# Patient Record
Sex: MALE | Race: WHITE | NOT HISPANIC OR LATINO | URBAN - METROPOLITAN AREA
[De-identification: names, ages, dates, MRNs, and addresses within clinical notes are randomized per-mention and may not be internally consistent; named-entity substitution may affect disease eponyms.]

---

## 2022-12-29 ENCOUNTER — OFFICE VISIT (OUTPATIENT)
Dept: FAMILY MEDICINE | Facility: CLINIC | Age: 32
End: 2022-12-29
Payer: COMMERCIAL

## 2022-12-29 VITALS
RESPIRATION RATE: 16 BRPM | SYSTOLIC BLOOD PRESSURE: 122 MMHG | OXYGEN SATURATION: 98 % | BODY MASS INDEX: 21.85 KG/M2 | WEIGHT: 156.12 LBS | TEMPERATURE: 98.7 F | HEART RATE: 69 BPM | HEIGHT: 71 IN | DIASTOLIC BLOOD PRESSURE: 83 MMHG

## 2022-12-29 DIAGNOSIS — Z00.00 ANNUAL PHYSICAL EXAM: Primary | ICD-10-CM

## 2022-12-29 DIAGNOSIS — Z13.220 SCREENING FOR LIPOID DISORDERS: ICD-10-CM

## 2022-12-29 DIAGNOSIS — Z11.3 SCREEN FOR STD (SEXUALLY TRANSMITTED DISEASE): ICD-10-CM

## 2022-12-29 PROBLEM — M25.511 CHRONIC RIGHT SHOULDER PAIN: Status: ACTIVE | Noted: 2022-10-31

## 2022-12-29 PROBLEM — G89.29 CHRONIC RIGHT SHOULDER PAIN: Status: ACTIVE | Noted: 2022-10-31

## 2022-12-29 PROBLEM — M75.41 INTERNAL IMPINGEMENT OF RIGHT SHOULDER: Status: ACTIVE | Noted: 2022-10-31

## 2022-12-29 LAB
ANION GAP SERPL CALCULATED.3IONS-SCNC: 14 MMOL/L (ref 7–15)
BUN SERPL-MCNC: 12.3 MG/DL (ref 6–20)
CALCIUM SERPL-MCNC: 9.6 MG/DL (ref 8.6–10)
CHLORIDE SERPL-SCNC: 101 MMOL/L (ref 98–107)
CHOLEST SERPL-MCNC: 185 MG/DL
CREAT SERPL-MCNC: 0.84 MG/DL (ref 0.67–1.17)
DEPRECATED HCO3 PLAS-SCNC: 24 MMOL/L (ref 22–29)
GFR SERPL CREATININE-BSD FRML MDRD: >90 ML/MIN/1.73M2
GLUCOSE SERPL-MCNC: 91 MG/DL (ref 70–99)
HCV AB SERPL QL IA: NONREACTIVE
HDLC SERPL-MCNC: 62 MG/DL
HIV 1+2 AB+HIV1 P24 AG SERPL QL IA: NONREACTIVE
LDLC SERPL CALC-MCNC: 111 MG/DL
NONHDLC SERPL-MCNC: 123 MG/DL
POTASSIUM SERPL-SCNC: 4.2 MMOL/L (ref 3.4–5.3)
SODIUM SERPL-SCNC: 139 MMOL/L (ref 136–145)
T PALLIDUM AB SER QL: NONREACTIVE
TRIGL SERPL-MCNC: 60 MG/DL

## 2022-12-29 PROCEDURE — 87389 HIV-1 AG W/HIV-1&-2 AB AG IA: CPT | Performed by: FAMILY MEDICINE

## 2022-12-29 PROCEDURE — 80061 LIPID PANEL: CPT | Performed by: FAMILY MEDICINE

## 2022-12-29 PROCEDURE — 86803 HEPATITIS C AB TEST: CPT | Performed by: FAMILY MEDICINE

## 2022-12-29 PROCEDURE — 87591 N.GONORRHOEAE DNA AMP PROB: CPT | Performed by: FAMILY MEDICINE

## 2022-12-29 PROCEDURE — 87491 CHLMYD TRACH DNA AMP PROBE: CPT | Performed by: FAMILY MEDICINE

## 2022-12-29 PROCEDURE — 86780 TREPONEMA PALLIDUM: CPT | Performed by: FAMILY MEDICINE

## 2022-12-29 PROCEDURE — 80048 BASIC METABOLIC PNL TOTAL CA: CPT | Performed by: FAMILY MEDICINE

## 2022-12-29 ASSESSMENT — ENCOUNTER SYMPTOMS
PARESTHESIAS: 0
HEADACHES: 0
COUGH: 0
HEARTBURN: 0
JOINT SWELLING: 0
SORE THROAT: 0
FREQUENCY: 0
NERVOUS/ANXIOUS: 0
SHORTNESS OF BREATH: 0
WEAKNESS: 0
PALPITATIONS: 0
MYALGIAS: 0
DIZZINESS: 0
FEVER: 0
EYE PAIN: 0
HEMATOCHEZIA: 0
NAUSEA: 0
DYSURIA: 0
ABDOMINAL PAIN: 0
ARTHRALGIAS: 0
HEMATURIA: 0
CONSTIPATION: 0
CHILLS: 0
DIARRHEA: 0

## 2022-12-29 NOTE — NURSING NOTE
"32 year old  Chief Complaint   Patient presents with     Physical     Routine.       Blood pressure 122/83, pulse 69, temperature 98.7  F (37.1  C), temperature source Skin, resp. rate 16, height 1.791 m (5' 10.5\"), weight 70.8 kg (156 lb 1.9 oz), SpO2 98 %. Body mass index is 22.08 kg/m .  There is no problem list on file for this patient.      Wt Readings from Last 2 Encounters:   12/29/22 70.8 kg (156 lb 1.9 oz)     BP Readings from Last 3 Encounters:   12/29/22 122/83         No current outpatient medications on file.     No current facility-administered medications for this visit.       Social History     Tobacco Use     Smoking status: Never     Smokeless tobacco: Never   Vaping Use     Vaping Use: Never used   Substance Use Topics     Alcohol use: Yes     Comment: 1 per day     Drug use: Never       Health Maintenance Due   Topic Date Due     YEARLY PREVENTIVE VISIT  Never done     ADVANCE CARE PLANNING  Never done     HIV SCREENING  Never done     HEPATITIS C SCREENING  Never done       No results found for: PAP      December 29, 2022 8:04 AM    "

## 2022-12-29 NOTE — PROGRESS NOTES
"Johann Costello presents to [facility] today to have an annual exam.     IMPRESSION  Healthy 31 yo male    ASSESSMENT/PLAN:    Annual Exam/Preventive Issues   -Check Lipids, BMP  - Screen for STDs. Discussed issues of false positives in low risk populations  - Discussed recommendations for healthy lifestyles  - Sign up for the \"[facility] [employer]\" system      -Follow up: in 1 year or sooner as needed.     Mikey Vega MD, MS    Introduction: This is Johann's first visit to our clinic.  He is here for an annual exam.  He is a healthy 32-year-old  who will be traveling to [facility] where he will be working remotely for the next few months.    He has no current concerns.  He has not had a physical exam in years.  With his upcoming travel and the end of the year coming, it occurred to him that he should schedule for a preventive visit exam.    Current Medications include:   No current outpatient medications on file.     No Known Allergies      Social  Social History     Social History Narrative    From [facility].     Went to [employer]. Studied environmental engineering. Works in [employer].     Was living in [place], [place].     Single.          Lifestyle habits and Preventive health issues:     Physical activity : Runs but limited by recent RIGHT 5th MT fracture. Enjoys lifting and skiing.   Diet is generally healthy    Alcohol intake involves about 3-4 nights/week and 3-4 drinks/night.    He does not use tobacco products.   His sleep is good.     Wears seat belt.--Yes.  Wears bike helmet--Yes.      MALE ROS  Partner: None.   Contraception: Condoms  STD concerns: None, but willing to be checked.     Dental history- No problems. Has regular care.     ROS  PHQ-2 Score:     PHQ-2 ( 1999 Pfizer) 12/29/2022   Q1: Little interest or pleasure in doing things 0   Q2: Feeling down, depressed or hopeless 0   PHQ-2 Score 0   Q1: Little interest or pleasure in doing things Not at all   Q2: Feeling down, " depressed or hopeless Not at all   PHQ-2 Score 0       Health Maintenance   Topic Date Due     YEARLY PREVENTIVE VISIT  Never done     ADVANCE CARE PLANNING  Never done     HIV SCREENING  Never done     HEPATITIS C SCREENING  Never done     DTAP/TDAP/TD IMMUNIZATION (8 - Td or Tdap) 10/12/2032     PHQ-2 (once per calendar year)  Completed     INFLUENZA VACCINE  Completed     IPV IMMUNIZATION  Completed     MENINGITIS IMMUNIZATION  Completed     HEPATITIS B IMMUNIZATION  Completed     COVID-19 Vaccine  Completed     Pneumococcal Vaccine: Pediatrics (0 to 5 Years) and At-Risk Patients (6 to 64 Years)  Aged Out         There is no problem list on file for this patient.      No past surgical history on file.    No family history on file.      Immunizations are as follows:      Immunization History   Administered Date(s) Administered     COVID-19 Vaccine 12+ (Pfizer) 04/05/2021, 04/26/2021     COVID-19 Vaccine 18+ (Moderna) 12/01/2021     COVID-19 Vaccine Bivalent Booster 12+ (Pfizer) 10/12/2022     DTAP (<7y) 1989, 1989, 02/21/1990, 02/19/1991, 07/12/1995     FLU 6-35 months 01/07/2010     Flu, Unspecified 01/07/2010     Hep B, Peds or Adolescent 1989, 02/08/1990, 08/08/1990     HepA-ped 2 Dose 03/05/2008, 06/09/2009     HepB, Unspecified 1989, 02/08/1990, 08/08/1990     Hib, Unspecified 02/19/1991, 07/21/1992     Influenza (H1N1) 01/07/2010     Influenza (IIV3) PF 11/09/2006, 11/09/2007     Influenza Intranasal Vaccine 11/16/2005     Influenza Vaccine >6 months (Alfuria,Fluzone) 10/28/2020, 10/12/2022     Japanese Encephalitis IM 06/18/2012, 07/16/2012, 10/12/2022     MMR 11/08/1990, 11/29/2001     Meningococcal ACWY (Menactra ) 03/05/2008     Nasal Influenza Vaccine 2-49 (FluMist) 11/16/2005     Polio, Unspecified  1989, 1989, 02/19/1991, 07/12/1995     Poliovirus, inactivated (IPV) 1989, 1989, 02/19/1991, 07/12/1995     Ramirez 06/18/2012, 06/25/2012, 07/16/2012     Td  "(Adult), Adsorbed 06/16/2003     Tdap (Adacel,Boostrix) 03/05/2008, 10/12/2022     Typhoid IM 06/18/2012, 06/03/2014     Yellow Fever 06/03/2014         EXAM  /83 (BP Location: Left arm, Patient Position: Sitting, Cuff Size: Adult Regular)   Pulse 69   Temp 98.7  F (37.1  C) (Skin)   Resp 16   Ht 1.791 m (5' 10.5\")   Wt 70.8 kg (156 lb 1.9 oz)   SpO2 98%   BMI 22.08 kg/m      GENERAL APPEARANCE: Appears well  HEENT: Neck is supple. No adenopathy, thyroid normal to palpation  RESP: Lungs clear to auscultation bilaterally.  Axillae: no palpable axillary masses or adenopathy  CV: regular rate and rhythm, normal S1 S2, no murmur, no carotid bruits  ABDOMEN: soft, nontender, without HSM or masses. Bowel sounds normal  : Normal male genitalia.  Testes are down.  No masses.  No hernias.   Rectal exam: deferred  MS: Extremities normal- no gross deformities noted, no tender, hot or swollen joints.    SKIN: no suspicious lesions or rashes  NEURO: Normal strength and tone, sensory exam grossly normal  PSYCH: mentation appears normal. and affect normal/bright.  EXT: no peripheral edema    SEE TOP OF NOTE FOR ASSESSMENT AND PLAN      Mikey Vega MD, MS  [facility] Department of Family Medicine and Community Health        "

## 2022-12-29 NOTE — PATIENT INSTRUCTIONS
"IMPRESSION  Healthy 31 yo male    ASSESSMENT/PLAN:    Annual Exam/Preventive Issues   -Check Lipids, BMP  - Screen for STDs  - Discussed recommendations for healthy lifestyles  - Sign up for the \"[employer]\" system      -Follow up: in 1 year or sooner as needed.     Mikey Vega MD, MS  "

## 2022-12-29 NOTE — LETTER
January 2, 2023      Johann Costello  [address]   [facility] [id number]      Johann,   Good news.   All tests were negative and your cholesterol levels were in the normal range.   -Mikey Vega MD       Resulted Orders   NEISSERIA GONORRHOEA PCR   Result Value Ref Range    Neisseria gonorrhoeae Negative Negative      Comment:      Negative for N. gonorrhoeae rRNA by transcription mediated amplification. A negative result by transcription mediated amplification does not preclude the presence of C. trachomatis infection because results are dependent on proper and adequate collection, absence of inhibitors and sufficient rRNA to be detected.   CHLAMYDIA TRACHOMATIS PCR   Result Value Ref Range    Chlamydia trachomatis Negative Negative      Comment:      A negative result by transcription mediated amplification does not preclude the presence of C. trachomatis infection because results are dependent on proper and adequate collection, absence of inhibitors and sufficient rRNA to be detected.   Basic metabolic panel   Result Value Ref Range    Sodium 139 136 - 145 mmol/L    Potassium 4.2 3.4 - 5.3 mmol/L    Chloride 101 98 - 107 mmol/L    Carbon Dioxide (CO2) 24 22 - 29 mmol/L    Anion Gap 14 7 - 15 mmol/L    Urea Nitrogen 12.3 6.0 - 20.0 mg/dL    Creatinine 0.84 0.67 - 1.17 mg/dL    Calcium 9.6 8.6 - 10.0 mg/dL    Glucose 91 70 - 99 mg/dL    GFR Estimate >90 >60 mL/min/1.73m2      Comment:      Effective December 21, 2021 eGFRcr in adults is calculated using the 2021 CKD-EPI creatinine equation which includes age and gender (Mac et al., NEJ, DOI: 10.1056/JANMwe3977732)   Lipid Profile   Result Value Ref Range    Cholesterol 185 <200 mg/dL    Triglycerides 60 <150 mg/dL    Direct Measure HDL 62 >=40 mg/dL    LDL Cholesterol Calculated 111 (H) <=100 mg/dL    Non HDL Cholesterol 123 <130 mg/dL    Narrative    Cholesterol  Desirable:  <200 mg/dL    Triglycerides  Normal:  Less than 150 mg/dL  Borderline  High:  150-199 mg/dL  High:  200-499 mg/dL  Very High:  Greater than or equal to 500 mg/dL    Direct Measure HDL  Female:  Greater than or equal to 50 mg/dL   Male:  Greater than or equal to 40 mg/dL    LDL Cholesterol  Desirable:  <100mg/dL  Above Desirable:  100-129 mg/dL   Borderline High:  130-159 mg/dL   High:  160-189 mg/dL   Very High:  >= 190 mg/dL    Non HDL Cholesterol  Desirable:  130 mg/dL  Above Desirable:  130-159 mg/dL  Borderline High:  160-189 mg/dL  High:  190-219 mg/dL  Very High:  Greater than or equal to 220 mg/dL   HIV Antigen Antibody Combo   Result Value Ref Range    HIV Antigen Antibody Combo Nonreactive Nonreactive      Comment:      HIV-1 p24 Ag & HIV-1/HIV-2 Ab Not Detected   Treponema Abs w Reflex to RPR and Titer   Result Value Ref Range    Treponema Antibody Total Nonreactive Nonreactive   Hepatitis C Screen Reflex to HCV RNA Quant and Genotype   Result Value Ref Range    Hepatitis C Antibody Nonreactive Nonreactive    Narrative    Assay performance characteristics have not been established for newborns, infants, and children.

## 2022-12-30 LAB
C TRACH DNA SPEC QL NAA+PROBE: NEGATIVE
N GONORRHOEA DNA SPEC QL NAA+PROBE: NEGATIVE

## 2024-01-03 ENCOUNTER — OFFICE VISIT (OUTPATIENT)
Dept: FAMILY MEDICINE | Facility: CLINIC | Age: 34
End: 2024-01-03
Payer: COMMERCIAL

## 2024-01-03 VITALS
WEIGHT: 171.5 LBS | TEMPERATURE: 98.3 F | RESPIRATION RATE: 17 BRPM | HEIGHT: 72 IN | BODY MASS INDEX: 23.23 KG/M2 | SYSTOLIC BLOOD PRESSURE: 128 MMHG | HEART RATE: 80 BPM | OXYGEN SATURATION: 98 % | DIASTOLIC BLOOD PRESSURE: 79 MMHG

## 2024-01-03 DIAGNOSIS — Z00.00 ROUTINE GENERAL MEDICAL EXAMINATION AT A HEALTH CARE FACILITY: Primary | ICD-10-CM

## 2024-01-03 DIAGNOSIS — I51.7 LEFT ATRIAL ENLARGEMENT: ICD-10-CM

## 2024-01-03 DIAGNOSIS — I49.9 IRREGULAR HEART BEATS: ICD-10-CM

## 2024-01-03 DIAGNOSIS — Z13.220 SCREENING FOR LIPID DISORDERS: ICD-10-CM

## 2024-01-03 DIAGNOSIS — Z11.3 ROUTINE SCREENING FOR STI (SEXUALLY TRANSMITTED INFECTION): ICD-10-CM

## 2024-01-03 DIAGNOSIS — Z13.1 SCREENING FOR DIABETES MELLITUS: ICD-10-CM

## 2024-01-03 PROCEDURE — 87591 N.GONORRHOEAE DNA AMP PROB: CPT | Performed by: FAMILY MEDICINE

## 2024-01-03 PROCEDURE — 87389 HIV-1 AG W/HIV-1&-2 AB AG IA: CPT | Performed by: FAMILY MEDICINE

## 2024-01-03 PROCEDURE — 86780 TREPONEMA PALLIDUM: CPT | Performed by: FAMILY MEDICINE

## 2024-01-03 PROCEDURE — 87491 CHLMYD TRACH DNA AMP PROBE: CPT | Performed by: FAMILY MEDICINE

## 2024-01-03 ASSESSMENT — ENCOUNTER SYMPTOMS
NERVOUS/ANXIOUS: 0
HEMATURIA: 0
NAUSEA: 0
FEVER: 0
JOINT SWELLING: 0
DIARRHEA: 0
ABDOMINAL PAIN: 0
PALPITATIONS: 0
EYE PAIN: 0
PARESTHESIAS: 0
HEARTBURN: 0
CONSTIPATION: 0
CHILLS: 0
COUGH: 0
HEMATOCHEZIA: 0
MYALGIAS: 0
DYSURIA: 0
HEADACHES: 0
DIZZINESS: 0
ARTHRALGIAS: 0
FREQUENCY: 0
SORE THROAT: 0
WEAKNESS: 0
SHORTNESS OF BREATH: 0

## 2024-01-03 NOTE — PATIENT INSTRUCTIONS
ASSESSMENT/PLAN:    Annual Exam/Preventive Issues   - Labs: Reviewed that his lipids and basic metabolic panels were normal last year.  Will test for STDs.  - Immunizations:   None needed today.  He recently had COVID and is now well from that.    - Other recommendations:   Given information about advanced directives    -Specific concerns:      rapid heartbeat noted at nighttime on occasion.   -  Elected to obtain an EKG today.  That revealed Normal sinus rhythm but with a comment about Left atrial enlargement (likely due to slightly wide P-wave on II and inverted p-wave on lead v1)  -Will obtain an Echocardiogram  -Pending results and whether irregular heart rate continues, will either obtain a ziopatch and/or refer to Cardiology. Johann will let me know if symptoms persist.       Mikey Vega MD  Family Medicine and Sports Medicine    Preventive Health Recommendations  Male Ages 26 - 39    Yearly exam:             See your health care provider every year in order to  o   Review health changes.   o   Discuss preventive care.    o   Review your medicines if your doctor has prescribed any.  You should be tested each year for STDs (sexually transmitted diseases), if you re at risk.   After age 35, talk to your provider about cholesterol testing. If you are at risk for heart disease, have your cholesterol tested at least every 5 years.   If you are at risk for diabetes, you should have a diabetes test (fasting glucose).  Shots: Get a flu shot each year. Get a tetanus shot every 10 years.     Nutrition:  Eat at least 5 servings of fruits and vegetables daily.   Eat whole-grain bread, whole-wheat pasta and brown rice instead of white grains and rice.   Get adequate Calcium and Vitamin D.     Lifestyle  Exercise for at least 150 minutes a week (30 minutes a day, 5 days a week). This will help you control your weight and prevent disease.   Limit alcohol to one drink per day.   No smoking.   Wear sunscreen to prevent  skin cancer.   See your dentist every six months for an exam and cleaning.

## 2024-01-03 NOTE — NURSING NOTE
"33 year old  Chief Complaint   Patient presents with    Physical     STI screening. Has noted some racing heart episodes, will sometimes notice when he wakes up early in the night.       Blood pressure 128/79, pulse 80, temperature 98.3  F (36.8  C), temperature source Temporal, resp. rate 17, height 1.816 m (5' 11.5\"), weight 77.8 kg (171 lb 8 oz), SpO2 98%. Body mass index is 23.59 kg/m .  Patient Active Problem List   Diagnosis    Chronic right shoulder pain    Internal impingement of right shoulder       Wt Readings from Last 2 Encounters:   01/03/24 77.8 kg (171 lb 8 oz)   12/29/22 70.8 kg (156 lb 1.9 oz)     BP Readings from Last 3 Encounters:   01/03/24 128/79   12/29/22 122/83         No current outpatient medications on file.     No current facility-administered medications for this visit.       Social History     Tobacco Use    Smoking status: Never    Smokeless tobacco: Never   Vaping Use    Vaping Use: Never used   Substance Use Topics    Alcohol use: Yes     Comment: 1 per day    Drug use: Never       Health Maintenance Due   Topic Date Due    ADVANCE CARE PLANNING  Never done    COVID-19 Vaccine (5 - 2023-24 season) 09/01/2023       No results found for: \"PAP\"      January 3, 2024 11:22 AM    "

## 2024-01-03 NOTE — PROGRESS NOTES
Johann Costello presents to [facility] today to have an annual exam.        ASSESSMENT/PLAN:    Annual Exam/Preventive Issues   - Labs: Reviewed that his lipids and basic metabolic panels were normal last year.  Will test for STDs.  - Immunizations:   None needed today.  He recently had COVID and is now well from that.    - Other recommendations:   Given information about advanced directives    -Specific concerns:      rapid heartbeat noted at nighttime on occasion.   -  Elected to obtain an EKG today.  That revealed Normal sinus rhythm but with a comment about Left atrial enlargement (likely due to slightly wide P-wave on II and inverted p-wave on lead v1)  -Will obtain an Echocardiogram  -Pending results and whether irregular heart rate continues, will either obtain a ziopatch and/or refer to Cardiology. Johann will let me know if symptoms persist.       Mikey Vega MD  Family Medicine and Sports Medicine      INTRODUCTION:   Johann is a healthy 34 yo who had an annual with me about a year ago.  He's here for an annual preventive exam.     In addition to Preventive issues, he wishes to discuss occasional racing heart beats. He notices it occasionally when awakening from sleep. Exercising well. Lifts and does cardio. No problem with exercise.   Also, interesting in STI screening. Has sex with women. Generally uses condoms. Not always.       Patient Active Problem List   Diagnosis    Chronic right shoulder pain    Internal impingement of right shoulder       Current Medications include:   No current outpatient medications on file.     No Known Allergies    No past surgical history on file.    Health Maintenance   Topic Date Due    ADVANCE CARE PLANNING  Never done    COVID-19 Vaccine (5 - 2023-24 season) 09/01/2023    YEARLY PREVENTIVE VISIT  01/03/2025    DTAP/TDAP/TD IMMUNIZATION (8 - Td or Tdap) 10/12/2032    HEPATITIS C SCREENING  Completed    HIV SCREENING  Completed    PHQ-2 (once per calendar  year)  Completed    INFLUENZA VACCINE  Completed    IPV IMMUNIZATION  Completed    MENINGITIS IMMUNIZATION  Completed    HEPATITIS B IMMUNIZATION  Completed    Pneumococcal Vaccine: Pediatrics (0 to 5 Years) and At-Risk Patients (6 to 64 Years)  Aged Out    HPV IMMUNIZATION  Aged Out    RSV MONOCLONAL ANTIBODY  Aged Out       Immunizations are as follows:      Immunization History   Administered Date(s) Administered    COVID-19 Bivalent 12+ (Pfizer) 10/12/2022    COVID-19 MONOVALENT 12+ (Pfizer) 04/05/2021, 04/26/2021    COVID-19 Monovalent 18+ (Moderna) 12/01/2021    DTAP (<7y) 1989, 1989, 02/21/1990, 02/19/1991, 07/12/1995    Flu, Unspecified 01/07/2010, 12/24/2018, 11/06/2019    HEPATITIS A (PEDS 12M-18Y) 03/05/2008, 06/09/2009    HepB, Unspecified 1989, 02/08/1990, 08/08/1990    Hepatitis B, Peds 1989, 02/08/1990, 08/08/1990    Hib, Unspecified 02/19/1991, 07/21/1992    Influenza (H1N1) 01/07/2010    Influenza (IIV3) PF 11/09/2006, 11/09/2007, 11/17/2016    Influenza Intranasal Vaccine 11/16/2005    Influenza Vaccine >6 months,quad, PF 12/17/2018, 10/28/2020, 10/12/2022, 09/06/2023    Influenza, seasonal, injectable, PF 01/07/2010    Japanese Encephalitis IM 06/18/2012, 07/16/2012, 10/12/2022    MMR 11/08/1990, 11/29/2001    Meningococcal ACWY (Menactra ) 03/05/2008    Nasal Influenza Vaccine 2-49 (FluMist) 11/16/2005    Polio, Unspecified  1989, 1989, 02/19/1991, 07/12/1995    Poliovirus, inactivated (IPV) 1989, 1989, 02/19/1991, 07/12/1995, 09/06/2023    Rabavert 06/18/2012, 06/25/2012, 07/16/2012    TDAP (Adacel,Boostrix) 03/05/2008, 10/12/2022    Td (Adult), Adsorbed 06/16/2003    Typhoid IM 06/18/2012, 06/03/2014    Yellow Fever 06/03/2014         Answers submitted by the patient for this visit:  Annual Preventive Visit (Submitted on 1/3/2024)  Chief Complaint: Annual Exam:  Frequency of exercise:: 4-5 days/week  Getting at least 3 servings of Calcium per  "day:: Yes  Diet:: Regular (no restrictions)  Taking medications regularly:: Yes  Medication side effects:: Not applicable  Bi-annual eye exam:: Yes  Dental care twice a year:: Yes  Sleep apnea or symptoms of sleep apnea:: None  abdominal pain: No  Blood in stool: No  Blood in urine: No  chest pain: No  chills: No  congestion: No  constipation: No  cough: No  diarrhea: No  dizziness: No  ear pain: No  eye pain: No  nervous/anxious: No  fever: No  frequency: No  genital sores: No  headaches: No  hearing loss: No  heartburn: No  arthralgias: No  joint swelling: No  peripheral edema: No  mood changes: No  myalgias: No  nausea: No  dysuria: No  palpitations: No  Skin sensation changes: No  sore throat: No  urgency: No  rash: No  shortness of breath: No  visual disturbance: No  weakness: No  impotence: No  penile discharge: No  Additional concerns today:: No  Exercise outside of work (Submitted on 1/3/2024)  Chief Complaint: Annual Exam:  Duration of exercise:: 15-30 minutes      Alcohol intake involves about 4-5 nights/week and 1-2 drinks/night.  Does does not use tobacco products.     Social:  Social History     Social History Narrative    From [place]. Went to [employer]. Studied environmental engineering. Works in [employer].     Was living in [place], [place].     Single.      Advance directive: none    ROS  PHQ-2 Score:         1/3/2024    11:17 AM 12/29/2022     8:01 AM   PHQ-2 ( 1999 Pfizer)   Q1: Little interest or pleasure in doing things 0 0   Q2: Feeling down, depressed or hopeless 0 0   PHQ-2 Score 0 0   Q1: Little interest or pleasure in doing things  Not at all   Q2: Feeling down, depressed or hopeless  Not at all   PHQ-2 Score  0           EXAM  /79 (BP Location: Left arm, Patient Position: Sitting, Cuff Size: Adult Large)   Pulse 80   Temp 98.3  F (36.8  C) (Temporal)   Resp 17   Ht 1.816 m (5' 11.5\")   Wt 77.8 kg (171 lb 8 oz)   SpO2 98%   BMI 23.59 kg/m      GENERAL APPEARANCE: " Appears well  HEENT: Neck is supple. No adenopathy, thyroid normal to palpation  RESP: Lungs clear to auscultation bilaterally.  Axillae: no palpable axillary masses or adenopathy  CV: regular rate and rhythm, normal S1 S2, no murmur, no carotid bruits  ABDOMEN: nontender, without HSM or masses. Bowel sounds normal  : Deferred. Reported no concerns.  Rectal exam: deferred  MS: No abnormalities noted. Extremities with generally normal range of motion.   SKIN: No suspicious lesions or rashes  NEURO: Normal strength and tone, sensory exam grossly normal  PSYCH: mentation and affect appear normal.   EXT: no peripheral edema      ECG: NSR at 74 bpm but with question of left atrial enlargement    SEE TOP OF NOTE FOR ASSESSMENT AND PLAN      Mikey Vega MD  Family Medicine and Sports Medicine  [facility] Department of Family Medicine and Community Health

## 2024-01-04 LAB
C TRACH DNA SPEC QL NAA+PROBE: NEGATIVE
HIV 1+2 AB+HIV1 P24 AG SERPL QL IA: NONREACTIVE
N GONORRHOEA DNA SPEC QL NAA+PROBE: NEGATIVE
T PALLIDUM AB SER QL: NONREACTIVE

## 2024-01-30 ENCOUNTER — ANCILLARY PROCEDURE (OUTPATIENT)
Dept: CARDIOLOGY | Facility: CLINIC | Age: 34
End: 2024-01-30
Attending: FAMILY MEDICINE
Payer: COMMERCIAL

## 2024-01-30 DIAGNOSIS — I51.7 LEFT ATRIAL ENLARGEMENT: ICD-10-CM

## 2024-01-30 LAB — LVEF ECHO: NORMAL

## 2024-01-30 PROCEDURE — 93306 TTE W/DOPPLER COMPLETE: CPT | Performed by: STUDENT IN AN ORGANIZED HEALTH CARE EDUCATION/TRAINING PROGRAM

## 2025-01-16 ENCOUNTER — OFFICE VISIT (OUTPATIENT)
Dept: FAMILY MEDICINE | Facility: CLINIC | Age: 35
End: 2025-01-16
Payer: COMMERCIAL

## 2025-01-16 VITALS
BODY MASS INDEX: 24.91 KG/M2 | HEART RATE: 87 BPM | WEIGHT: 174 LBS | SYSTOLIC BLOOD PRESSURE: 112 MMHG | TEMPERATURE: 97.3 F | OXYGEN SATURATION: 97 % | HEIGHT: 70 IN | DIASTOLIC BLOOD PRESSURE: 70 MMHG

## 2025-01-16 DIAGNOSIS — L30.9 ACUTE ECZEMA: Primary | ICD-10-CM

## 2025-01-16 NOTE — PROGRESS NOTES
"CC: Testicular/scrotal Pain (Irritation in genital area, noticed yesterday it was reddish area. )      SUBJECTIVE: Johann is a 34 year old male who comes in with the following concerns:    Sent [employer] message yesterday:   \"In the last couple of days a small area of red, irritated skin appeared on my penis. This morning I noticed a couple of additional areas that look slightly irritated. I am not experiencing any pain. I am dating a woman and we have been sexually active over the last couple of months. She's the only partner I've had and I'm the only partner she has had, and she has not noticed any changes to her body. What do you recommend I do to determine the cause of the red skin?\"    Red area  Similar symptoms about a month ago that went away on its own  Not itchy  No new lotions, soaps, detergents, lubricants, condoms  No penile discharge, dysuria   No treatment tried   Partner without a rash   No STI concerns - mutually monogamous, first partners    Similar rash on hands and left upper arm as well    Dad with eczema      OBJECTIVE:   /70   Pulse 87   Temp 97.3  F (36.3  C)   Ht 1.778 m (5' 10\")   Wt 78.9 kg (174 lb)   SpO2 97%   BMI 24.97 kg/m    General: Alert and oriented in no acute distress.  Skin: On left upper arm, there is a patch of scaly erythematous skin; bilateral hands dry with some cracking skin, on the right side of the penis there are 2 patches of circular erythema with overlying scale      ASSESSMENT/PLAN:   Johann was seen today for testicular/scrotal pain.    Diagnoses and all orders for this visit:    Acute eczema    Exam most c/w eczema of genitalia, as well as eczema patch on the arm. Recommend OTC hydrocortisone - discussed limiting use d/t side effects. Discussed importance of regular moisturizing especially in winter months. Worrisome signs and symptoms were discussed with Johann and he was instructed to return to the clinic for concerning symptoms or to call with " questions. Return if symptoms worsen or fail to improve.      Rosalba Arredondo MD  Family Medicine

## 2025-02-08 ENCOUNTER — HEALTH MAINTENANCE LETTER (OUTPATIENT)
Age: 35
End: 2025-02-08